# Patient Record
Sex: FEMALE | Employment: FULL TIME | URBAN - METROPOLITAN AREA
[De-identification: names, ages, dates, MRNs, and addresses within clinical notes are randomized per-mention and may not be internally consistent; named-entity substitution may affect disease eponyms.]

---

## 2021-07-24 ENCOUNTER — OCC HEALTH (OUTPATIENT)
Dept: OCCUPATIONAL MEDICINE | Age: 41
End: 2021-07-24
Attending: PHYSICIAN ASSISTANT

## 2021-07-24 DIAGNOSIS — S66.411A STRAIN OF INTRINSIC MUSCLE OF RIGHT THUMB: Primary | ICD-10-CM

## 2021-07-24 DIAGNOSIS — S44.91XA NEUROPRAXIA OF UPPER EXTREMITY, RIGHT, INITIAL ENCOUNTER: ICD-10-CM

## 2021-09-29 ENCOUNTER — OCC HEALTH (OUTPATIENT)
Dept: OCCUPATIONAL MEDICINE | Age: 41
End: 2021-09-29
Attending: FAMILY MEDICINE

## 2021-09-30 ENCOUNTER — TELEPHONE (OUTPATIENT)
Dept: ORTHOPEDICS CLINIC | Facility: CLINIC | Age: 41
End: 2021-09-30

## 2021-09-30 DIAGNOSIS — M79.641 RIGHT HAND PAIN: Primary | ICD-10-CM

## 2021-09-30 NOTE — TELEPHONE ENCOUNTER
Future Appointments   Date Time Provider Chino Zuniga   10/21/2021  9:00 AM Kiana Marcum MD [facility]     · Pt is coming in for RIGHT hand pain (workers comp)   · No imaging in [place]  · Please advise if imaging is needed

## 2021-09-30 NOTE — TELEPHONE ENCOUNTER
Requisite for xray order  Reviewed patients chart, xray orders are required. Order placed for right hand xrays  • Spoke to patient informed to arrive 30 mins prior to patients appt to complete x-ray order. Patient verbalized understanding and agreement.   X

## 2021-10-21 ENCOUNTER — HOSPITAL ENCOUNTER (OUTPATIENT)
Dept: GENERAL RADIOLOGY | Age: 41
Discharge: HOME | End: 2021-10-21
Attending: ORTHOPAEDIC SURGERY
Payer: OTHER MISCELLANEOUS

## 2021-10-21 ENCOUNTER — OFFICE VISIT (OUTPATIENT)
Dept: ORTHOPEDICS CLINIC | Facility: CLINIC | Age: 41
End: 2021-10-21
Payer: OTHER MISCELLANEOUS

## 2021-10-21 VITALS — HEART RATE: 61 BPM | OXYGEN SATURATION: 100 %

## 2021-10-21 DIAGNOSIS — S63.641A RUPTURE OF ULNAR COLLATERAL LIGAMENT OF RIGHT THUMB, INITIAL ENCOUNTER: Primary | ICD-10-CM

## 2021-10-21 DIAGNOSIS — M79.641 RIGHT HAND PAIN: ICD-10-CM

## 2021-10-21 DIAGNOSIS — G56.01 CARPAL TUNNEL SYNDROME OF RIGHT WRIST: ICD-10-CM

## 2021-10-21 PROCEDURE — 99204 OFFICE O/P NEW MOD 45 MIN: CPT | Performed by: ORTHOPAEDIC SURGERY

## 2021-10-21 PROCEDURE — 73130 X-RAY EXAM OF HAND: CPT | Performed by: ORTHOPAEDIC SURGERY

## 2021-10-21 NOTE — H&P
Clinic Note [facility]     Assessment/Plan:  47year old female    1. Right carpal tunnel syndrome–wear a brace at nighttime. Will reevaluate her symptoms in 6 weeks. If they are not improved will obtain an [facility].   No stocking at work as it seem significantly positive sleep secondary to the pain. History reviewed. No pertinent past medical history.   Past Surgical History:   Procedure Laterality Date   •   08    fetal heart rate non-reassuring     No current outpatient medications

## 2021-11-02 ENCOUNTER — TELEPHONE (OUTPATIENT)
Dept: ORTHOPEDICS CLINIC | Facility: CLINIC | Age: 41
End: 2021-11-02

## 2021-11-02 NOTE — TELEPHONE ENCOUNTER
Faxed 5 pages of records to Kaelyn Corley - medical review [phone], faxed and confirmed for authorization of MRI of right thumb

## 2021-11-05 ENCOUNTER — TELEPHONE (OUTPATIENT)
Dept: ORTHOPEDICS CLINIC | Facility: CLINIC | Age: 41
End: 2021-11-05

## 2021-11-05 NOTE — TELEPHONE ENCOUNTER
Spoke to patient and notified her that [facility] denied her MRI of right thumb. I offered patient [employer] where she can self pay if she didn't want to use insurance.  Pt will call back and let me know if she wants another MRI place

## 2022-01-18 ENCOUNTER — OCC HEALTH (OUTPATIENT)
Dept: OCCUPATIONAL MEDICINE | Age: 42
End: 2022-01-18
Attending: PHYSICIAN ASSISTANT

## 2022-01-18 ENCOUNTER — HOSPITAL ENCOUNTER (OUTPATIENT)
Dept: GENERAL RADIOLOGY | Age: 42
Discharge: HOME | End: 2022-01-18
Attending: PHYSICIAN ASSISTANT

## 2022-01-18 DIAGNOSIS — S66.212A STRAIN OF EXTENSOR MUSCLE, FASCIA AND TENDON OF LEFT THUMB AT WRIST AND HAND LEVEL, INITIAL ENCOUNTER: Primary | ICD-10-CM

## 2022-01-18 DIAGNOSIS — S66.212A STRAIN OF EXTENSOR MUSCLE, FASCIA AND TENDON OF LEFT THUMB AT WRIST AND HAND LEVEL, INITIAL ENCOUNTER: ICD-10-CM

## 2022-01-18 PROCEDURE — 73140 X-RAY EXAM OF FINGER(S): CPT | Performed by: PHYSICIAN ASSISTANT

## 2022-02-28 ENCOUNTER — OCC HEALTH (OUTPATIENT)
Dept: OCCUPATIONAL MEDICINE | Age: 42
End: 2022-02-28
Attending: PHYSICIAN ASSISTANT

## 2022-02-28 DIAGNOSIS — S63.622A: Primary | ICD-10-CM

## 2022-03-08 ENCOUNTER — TELEPHONE (OUTPATIENT)
Dept: ORTHOPEDICS CLINIC | Facility: CLINIC | Age: 42
End: 2022-03-08

## 2022-03-08 NOTE — TELEPHONE ENCOUNTER
Patient is scheduled with Dr. Patsy Díaz for a left hand injury. Please advise if imaging is needed.

## 2022-03-08 NOTE — TELEPHONE ENCOUNTER
Last Xray results: 1/18/22  XR FINGER(S) (MIN 2 VIEWS), LEFT THUMB (CPT=73140)  Narrative: PROCEDURE:  XR FINGER(S) (MIN 2 VIEWS), LEFT THUMB (CPT=73140)     INDICATIONS:  S96.768Y Strain of extensor muscle, fascia and tendon of left thumb at wrist and hand level, initial encounter     COMPARISON:  [place], XR, XR HAND (MIN 3 VIEWS), RIGHT (CPT=73130), 10/21/2021, 9:28 AM.     TECHNIQUE:  Three views of the finger were obtained. PATIENT STATED HISTORY: (As transcribed by Technologist)  Patient states pain to left thumb, towards proximal phalax since yesterday. No known trauma. States she was lifting heavy objects yesterday at work. Shielded          FINDINGS:  Osseous structures are intact. Joint spaces are preserved. No dislocation. Impression: CONCLUSION:  Unremarkable left thumb radiographs. See above description.           Dictated by (CST): Melanie Milian MD on 1/18/2022 at 9:55 AM       Finalized by (CST): Melanie Milian MD on 1/18/2022 at 9:56 AM        Future Appointments   Date Time Provider Chino Zuniga   3/24/2022  8:30 AM Ivana Anglin MD [facility]

## 2022-03-24 ENCOUNTER — OFFICE VISIT (OUTPATIENT)
Dept: ORTHOPEDICS CLINIC | Facility: CLINIC | Age: 42
End: 2022-03-24
Payer: OTHER MISCELLANEOUS

## 2022-03-24 VITALS — BODY MASS INDEX: 19.26 KG/M2 | WEIGHT: 102 LBS | HEIGHT: 61 IN

## 2022-03-24 DIAGNOSIS — M77.8 TENDONITIS OF FINGER: Primary | ICD-10-CM

## 2022-03-24 PROCEDURE — 99213 OFFICE O/P EST LOW 20 MIN: CPT | Performed by: ORTHOPAEDIC SURGERY

## 2022-03-24 PROCEDURE — 3008F BODY MASS INDEX DOCD: CPT | Performed by: ORTHOPAEDIC SURGERY

## 2022-03-24 RX ORDER — MELOXICAM 7.5 MG/1
7.5 TABLET ORAL DAILY
Qty: 30 TABLET | Refills: 0 | Status: SHIPPED | OUTPATIENT
Start: 2022-03-24

## 2022-03-30 ENCOUNTER — TELEPHONE (OUTPATIENT)
Dept: ORTHOPEDICS CLINIC | Facility: CLINIC | Age: 42
End: 2022-03-30

## 2022-03-30 NOTE — TELEPHONE ENCOUNTER
Patient came in to drop off paperwork needing to be completed by Dr. Michelle Carcamo. Please advise patient when forms are completed and patient needs to pay the Forms Completion fee of $25.00 at time of .

## 2022-04-07 ENCOUNTER — TELEPHONE (OUTPATIENT)
Dept: ORTHOPEDICS CLINIC | Facility: CLINIC | Age: 42
End: 2022-04-07

## 2023-08-19 ENCOUNTER — HOSPITAL ENCOUNTER (OUTPATIENT)
Age: 43
Discharge: HOME | End: 2023-08-19
Payer: COMMERCIAL

## 2023-08-19 VITALS
SYSTOLIC BLOOD PRESSURE: 125 MMHG | HEIGHT: 61 IN | HEART RATE: 111 BPM | WEIGHT: 109 LBS | DIASTOLIC BLOOD PRESSURE: 82 MMHG | RESPIRATION RATE: 18 BRPM | TEMPERATURE: 98 F | OXYGEN SATURATION: 99 % | BODY MASS INDEX: 20.58 KG/M2

## 2023-08-19 DIAGNOSIS — M62.838 TRAPEZIUS MUSCLE SPASM: Primary | ICD-10-CM

## 2023-08-19 PROCEDURE — 99214 OFFICE O/P EST MOD 30 MIN: CPT

## 2023-08-19 PROCEDURE — 99213 OFFICE O/P EST LOW 20 MIN: CPT

## 2023-08-19 RX ORDER — PREDNISONE 20 MG/1
20 TABLET ORAL DAILY
Qty: 5 TABLET | Refills: 0 | Status: SHIPPED | OUTPATIENT
Start: 2023-08-19 | End: 2023-08-24

## 2023-08-19 RX ORDER — CYCLOBENZAPRINE HCL 10 MG
10 TABLET ORAL 3 TIMES DAILY PRN
Qty: 20 TABLET | Refills: 0 | Status: SHIPPED | OUTPATIENT
Start: 2023-08-19 | End: 2023-08-26

## 2023-12-28 ENCOUNTER — OFFICE VISIT (OUTPATIENT)
Dept: OCCUPATIONAL MEDICINE | Age: 43
End: 2023-12-28
Attending: FAMILY MEDICINE

## 2023-12-28 ENCOUNTER — HOSPITAL ENCOUNTER (OUTPATIENT)
Dept: GENERAL RADIOLOGY | Age: 43
Discharge: HOME | End: 2023-12-28
Attending: FAMILY MEDICINE

## 2023-12-28 DIAGNOSIS — S89.92XA LEFT KNEE INJURY, INITIAL ENCOUNTER: ICD-10-CM

## 2023-12-28 DIAGNOSIS — S89.92XA LEFT KNEE INJURY, INITIAL ENCOUNTER: Primary | ICD-10-CM

## 2023-12-28 PROCEDURE — 73562 X-RAY EXAM OF KNEE 3: CPT | Performed by: FAMILY MEDICINE

## 2024-01-04 ENCOUNTER — OFFICE VISIT (OUTPATIENT)
Dept: OCCUPATIONAL MEDICINE | Age: 44
End: 2024-01-04
Attending: PHYSICIAN ASSISTANT

## 2024-01-04 DIAGNOSIS — S83.92XD SPRAIN OF LEFT KNEE, SUBSEQUENT ENCOUNTER: Primary | ICD-10-CM

## (undated) NOTE — LETTER
Date: 3/24/2022    Patient Name: Jaylen Jonas          To Whom it may concern: This letter has been written at the patient's request. The above patient was seen at the [facility] for treatment of a medical condition. Patient can return to work without restrictions starting 3/25/22. Sincerely,      Albert Mejia MD  Hand, Wrist, & Elbow Surgery  [facility]  [place] [address], [address] [facility], [address] [place] [address]  [email or web address]  t: [phone]  f: [phone]

## (undated) NOTE — LETTER
Date: 10/21/2021    Patient Name: Braden Phelps          To Whom it may concern: This letter has been written at the patient's request. The above patient was seen at the [facility] for treatment of a medical condition.     Patient can work